# Patient Record
Sex: FEMALE | Race: WHITE | NOT HISPANIC OR LATINO | URBAN - METROPOLITAN AREA
[De-identification: names, ages, dates, MRNs, and addresses within clinical notes are randomized per-mention and may not be internally consistent; named-entity substitution may affect disease eponyms.]

---

## 2023-10-20 ENCOUNTER — OFFICE VISIT (OUTPATIENT)
Dept: URBAN - METROPOLITAN AREA CLINIC 82 | Facility: CLINIC | Age: 53
End: 2023-10-20
Payer: COMMERCIAL

## 2023-10-20 VITALS
DIASTOLIC BLOOD PRESSURE: 91 MMHG | BODY MASS INDEX: 37.32 KG/M2 | WEIGHT: 224 LBS | SYSTOLIC BLOOD PRESSURE: 136 MMHG | HEIGHT: 65 IN | TEMPERATURE: 98.1 F | HEART RATE: 92 BPM

## 2023-10-20 DIAGNOSIS — K76.0 FATTY LIVER: ICD-10-CM

## 2023-10-20 DIAGNOSIS — K50.111 CROHN'S DISEASE OF LARGE INTESTINE WITH RECTAL BLEEDING: ICD-10-CM

## 2023-10-20 PROBLEM — 197321007: Status: ACTIVE | Noted: 2023-10-20

## 2023-10-20 PROBLEM — 7620006: Status: ACTIVE | Noted: 2023-10-20

## 2023-10-20 PROCEDURE — 99204 OFFICE O/P NEW MOD 45 MIN: CPT | Performed by: INTERNAL MEDICINE

## 2023-10-20 NOTE — HPI-TODAY'S VISIT:
Patient is 52-year-old female goes by name Moi was seen today for complaints of having Crohn's flareup.  Patient reports diagnosis of Crohn's disease in 2019 diagnosed with a [facility].  At that time she has had episodes of rectal bleeding and diarrhea.  Patient had all the work-up from 2019 and they were reviewed including the recent CT scan of the abdomen pelvis.  Patient reports diagnosis of Crohn's colitis and she recalls that taking some medications which sounded more steroids at that time her symptoms have resolved on steroid treatment.  Patient was advised to go on proper Crohn's treatment however she declined any treatment because the concerns for side effects profile.  Patient stated her previous GI was okay with that and most recently in the past few months she started having more abdominal pain diarrhea constipation.  Patient underwent CT scan of the abdomen pelvis showing diffuse colitis.  Patient denies any daily bloody stools however she reports intermittent blood per rectum.  Patient also reports having right lower quadrant pain and left lower quadrant abdominal pain.  She was prescribed mesalamine by her GI recently and she could not continue with them because there are insurance reasons.  Patient stated she has not been compliant with the mesalamine and she felt like more even few doses of mesalamine has caused her constipation.  During the CT evaluation was liver was also noted to be fatty liver.  Patient denies any significant alcohol drinking.  Patient denies any daily steroid use or chronic steroid use.  Patient has a BMI of 37.7.  Denies any family history of IBD or cirrhosis of the liver.  Patient did not have any prior complications from Crohn's disease did not have any prior surgeries or strictures or fistulas.

## 2023-10-23 ENCOUNTER — WEB ENCOUNTER (OUTPATIENT)
Dept: URBAN - METROPOLITAN AREA CLINIC 82 | Facility: CLINIC | Age: 53
End: 2023-10-23

## 2023-10-27 LAB
A/G RATIO: 1.8
ABSOLUTE BASOPHILS: 32
ABSOLUTE EOSINOPHILS: 21
ABSOLUTE LYMPHOCYTES: 1134
ABSOLUTE MONOCYTES: 392
ABSOLUTE NEUTROPHILS: 3721
ALBUMIN: 4.6
ALKALINE PHOSPHATASE: 113
ALT (SGPT): 28
ANA PATTERN: (no result)
ANA SCREEN, IFA: POSITIVE
ANTINUCLEAR ANTIBODIES, IFA: (no result)
AST (SGOT): 20
BASOPHILS: 0.6
BILIRUBIN, TOTAL: 0.5
BUN/CREATININE RATIO: (no result)
BUN: 14
C-REACTIVE PROTEIN, QUANT: 3
CALCIUM: 9.8
CARBON DIOXIDE, TOTAL: 28
CHLORIDE: 102
CREATININE: 0.74
EGFR: 97
EOSINOPHILS: 0.4
GLOBULIN, TOTAL: 2.6
GLUCOSE: 90
HEMATOCRIT: 48.2
HEMOGLOBIN: 16.2
HEPATITIS A AB, TOTAL: (no result)
HEPATITIS B CORE AB TOTAL: (no result)
HEPATITIS B SURFACE AB IMMUNITY, QN: <5
HEPATITIS B SURFACE ANTIGEN: (no result)
HEPATITIS C ANTIBODY: (no result)
IMMUNOGLOBULIN A: 211
INTERPRETATION: (no result)
LYMPHOCYTES: 21.4
MCH: 30.3
MCHC: 33.6
MCV: 90.1
MITOCHONDRIAL (M2) ANTIBODY: <=20
MITOGEN-NIL: >10
MONOCYTES: 7.4
MPV: 10.7
NEUTROPHILS: 70.2
PLATELET COUNT: 279
POTASSIUM: 4.4
PROTEIN, TOTAL: 7.2
QUANTIFERON NIL VALUE: 0.04
QUANTIFERON TB1 AG VALUE: 0
QUANTIFERON TB2 AG VALUE: <0
QUANTIFERON-TB GOLD PLUS: NEGATIVE
RDW: 12.3
RED BLOOD CELL COUNT: 5.35
SODIUM: 141
TISSUE TRANSGLUTAMINASE AB, IGA: <1
WHITE BLOOD CELL COUNT: 5.3

## 2023-12-21 ENCOUNTER — OFFICE VISIT (OUTPATIENT)
Dept: URBAN - METROPOLITAN AREA CLINIC 37 | Facility: CLINIC | Age: 53
End: 2023-12-21
Payer: COMMERCIAL

## 2023-12-21 ENCOUNTER — LAB OUTSIDE AN ENCOUNTER (OUTPATIENT)
Dept: URBAN - METROPOLITAN AREA CLINIC 37 | Facility: CLINIC | Age: 53
End: 2023-12-21

## 2023-12-21 VITALS
HEIGHT: 65 IN | DIASTOLIC BLOOD PRESSURE: 82 MMHG | OXYGEN SATURATION: 96 % | SYSTOLIC BLOOD PRESSURE: 128 MMHG | WEIGHT: 223 LBS | BODY MASS INDEX: 37.15 KG/M2 | HEART RATE: 96 BPM

## 2023-12-21 DIAGNOSIS — K50.111 CROHN'S DISEASE OF LARGE INTESTINE WITH RECTAL BLEEDING: ICD-10-CM

## 2023-12-21 DIAGNOSIS — K76.0 FATTY LIVER: ICD-10-CM

## 2023-12-21 DIAGNOSIS — R12 CHRONIC HEARTBURN: ICD-10-CM

## 2023-12-21 PROBLEM — 16331000: Status: ACTIVE | Noted: 2023-12-21

## 2023-12-21 PROCEDURE — 99214 OFFICE O/P EST MOD 30 MIN: CPT | Performed by: INTERNAL MEDICINE

## 2023-12-21 RX ORDER — SOD SULF/POT CHLORIDE/MAG SULF 1.479 G
12 TABLETS THE FIRST DOSE THE EVENING BEFORE AND SECOND DOSE THE MORNING OF COLONOSCOPY TABLET ORAL AS DIRECTED
Qty: 24 | Refills: 0 | OUTPATIENT
Start: 2023-12-21 | End: 2023-12-23

## 2023-12-21 RX ORDER — MESALAMINE 1.2 G/1
2 TABLETS WITH A MEAL TABLET, DELAYED RELEASE ORAL ONCE A DAY
Status: ACTIVE | COMMUNITY

## 2023-12-21 RX ORDER — FAMOTIDINE 40 MG/1
1 TABLET AT BEDTIME TABLET, FILM COATED ORAL ONCE A DAY
Qty: 30 | Refills: 3 | OUTPATIENT
Start: 2023-12-21

## 2023-12-21 RX ORDER — MESALAMINE 1.2 G/1
2 TABLETS WITH A MEAL TABLET, DELAYED RELEASE ORAL ONCE A DAY
Qty: 60 | Refills: 1 | OUTPATIENT
Start: 2023-12-21 | End: 2024-02-19

## 2023-12-21 NOTE — HPI-HEARTBURN
Onset of symptoms: Intermittently since teenage years Description: Constant, but is currently well-managed with current medication regimen Aggravating factors: Fatty foods, sugery foods, weight gain Associated symptoms: Patient admits dyspepsia. Patient denies any symptoms of nausea, vomiting, early satiety, dysphagia, or odynophagia. Medications tried: Pepcid 40 mg QHS, Tums Test/evaluations done previously: Last EGD done on 08/19/2019 with Dr. Wally Hanson. No histologic evidene of H. pylori or intestinal metaplasia

## 2023-12-21 NOTE — HPI-CROHNS
John is here for an second opinion was previously following up with former GI, Dr. Wally Hanson and Dr. Linda Carney. Patient is establishing care with me since her insurance recently changed and is not accepted by her previous GI.

## 2023-12-21 NOTE — HPI-FATTY LIVER
Intially findings on CT Abdomen and Pelvis done on 08/2023, see below Patient was seen by Dr. Linda Dubois 2 months ago and ordered labs, which were completed on 10/2023.  Labs showed mild CLAYTON elevation and normal LFT's  Also negative serology for viral hepatitis and no immunity to Hepatitis B Patient denies use of alcohol Patient denies any family history of liver disease or malignancy

## 2024-03-01 ENCOUNTER — LAB (OUTPATIENT)
Dept: URBAN - METROPOLITAN AREA CLINIC 4 | Facility: CLINIC | Age: 54
End: 2024-03-01
Payer: COMMERCIAL

## 2024-03-01 ENCOUNTER — COL/EGD (OUTPATIENT)
Dept: URBAN - METROPOLITAN AREA SURGERY CENTER 8 | Facility: SURGERY CENTER | Age: 54
End: 2024-03-01
Payer: COMMERCIAL

## 2024-03-01 DIAGNOSIS — K31.89 OTHER DISEASES OF STOMACH AND DUODENUM: ICD-10-CM

## 2024-03-01 DIAGNOSIS — K31.7 BENIGN GASTRIC POLYP: ICD-10-CM

## 2024-03-01 DIAGNOSIS — D12.4 ADENOMA OF DESCENDING COLON: ICD-10-CM

## 2024-03-01 DIAGNOSIS — K63.89 OTHER SPECIFIED DISEASES OF INTESTINE: ICD-10-CM

## 2024-03-01 DIAGNOSIS — D12.3 ADENOMA OF TRANSVERSE COLON: ICD-10-CM

## 2024-03-01 DIAGNOSIS — K21.9 ACID REFLUX: ICD-10-CM

## 2024-03-01 DIAGNOSIS — K63.5 BENIGN COLON POLYP: ICD-10-CM

## 2024-03-01 PROCEDURE — 43255 EGD CONTROL BLEEDING ANY: CPT | Performed by: INTERNAL MEDICINE

## 2024-03-01 PROCEDURE — 88312 SPECIAL STAINS GROUP 1: CPT | Performed by: PATHOLOGY

## 2024-03-01 PROCEDURE — 43239 EGD BIOPSY SINGLE/MULTIPLE: CPT | Performed by: INTERNAL MEDICINE

## 2024-03-01 PROCEDURE — 43250 EGD CAUTERY TUMOR POLYP: CPT | Performed by: INTERNAL MEDICINE

## 2024-03-01 PROCEDURE — 45385 COLONOSCOPY W/LESION REMOVAL: CPT | Performed by: INTERNAL MEDICINE

## 2024-03-01 PROCEDURE — 88305 TISSUE EXAM BY PATHOLOGIST: CPT | Performed by: PATHOLOGY

## 2024-03-01 PROCEDURE — 45380 COLONOSCOPY AND BIOPSY: CPT | Performed by: INTERNAL MEDICINE

## 2024-03-01 RX ORDER — MESALAMINE 1.2 G/1
2 TABLETS WITH A MEAL TABLET, DELAYED RELEASE ORAL ONCE A DAY
Status: ACTIVE | COMMUNITY

## 2024-03-01 RX ORDER — FAMOTIDINE 40 MG/1
1 TABLET AT BEDTIME TABLET, FILM COATED ORAL ONCE A DAY
Qty: 30 | Refills: 3 | Status: ACTIVE | COMMUNITY
Start: 2023-12-21

## 2024-03-19 PROBLEM — 1230009008: Status: ACTIVE | Noted: 2024-03-19

## 2024-03-19 PROBLEM — 724538004: Status: ACTIVE | Noted: 2024-03-19

## 2024-03-19 PROBLEM — 266433003: Status: ACTIVE | Noted: 2024-03-19

## 2024-03-19 PROBLEM — 300855001: Status: ACTIVE | Noted: 2024-03-19

## 2024-03-19 PROBLEM — 428283002: Status: ACTIVE | Noted: 2024-03-19

## 2024-03-21 ENCOUNTER — OV EP (OUTPATIENT)
Dept: URBAN - METROPOLITAN AREA CLINIC 37 | Facility: CLINIC | Age: 54
End: 2024-03-21
Payer: COMMERCIAL

## 2024-03-21 VITALS
HEIGHT: 65 IN | DIASTOLIC BLOOD PRESSURE: 80 MMHG | SYSTOLIC BLOOD PRESSURE: 160 MMHG | BODY MASS INDEX: 36.65 KG/M2 | WEIGHT: 220 LBS

## 2024-03-21 DIAGNOSIS — K76.0 FATTY LIVER: ICD-10-CM

## 2024-03-21 DIAGNOSIS — K31.7 POLYP OF STOMACH AND DUODENUM: ICD-10-CM

## 2024-03-21 DIAGNOSIS — K21.00 GASTROESOPHAGEAL REFLUX DISEASE WITH ESOPHAGITIS WITHOUT HEMORRHAGE: ICD-10-CM

## 2024-03-21 DIAGNOSIS — K57.30 DIVERTICULOSIS OF LARGE INTESTINE WITHOUT PERFORATION OR ABSCESS WITHOUT BLEEDING: ICD-10-CM

## 2024-03-21 DIAGNOSIS — K50.111 CROHN'S DISEASE OF LARGE INTESTINE WITH RECTAL BLEEDING: ICD-10-CM

## 2024-03-21 DIAGNOSIS — K29.01 ACUTE SUPERFICIAL GASTRITIS WITH HEMORRHAGE: ICD-10-CM

## 2024-03-21 DIAGNOSIS — Z86.010 PERSONAL HISTORY OF COLONIC POLYPS: ICD-10-CM

## 2024-03-21 DIAGNOSIS — D12.6 SESSILE SERRATED POLYP OF COLON: ICD-10-CM

## 2024-03-21 PROBLEM — 2367005: Status: ACTIVE | Noted: 2024-03-21

## 2024-03-21 PROCEDURE — 99214 OFFICE O/P EST MOD 30 MIN: CPT | Performed by: INTERNAL MEDICINE

## 2024-03-21 RX ORDER — MESALAMINE 1.2 G/1
2 TABLETS WITH A MEAL TABLET, DELAYED RELEASE ORAL ONCE A DAY
Qty: 180 TABLET | Refills: 3 | OUTPATIENT
Start: 2024-03-21 | End: 2025-03-16

## 2024-03-21 RX ORDER — MESALAMINE 1.2 G/1
2 TABLETS WITH A MEAL TABLET, DELAYED RELEASE ORAL ONCE A DAY
Qty: 60 TABLET | Refills: 1 | Status: ACTIVE | COMMUNITY

## 2024-03-21 RX ORDER — FAMOTIDINE 40 MG/1
1 TABLET AT BEDTIME TABLET, FILM COATED ORAL ONCE A DAY
Qty: 30 | Refills: 3 | Status: DISCONTINUED | COMMUNITY
Start: 2023-12-21

## 2024-03-21 RX ORDER — SUCRALFATE 1 G/1
1 TABLET ON AN EMPTY STOMACH TABLET ORAL
Qty: 60 | Refills: 2 | OUTPATIENT
Start: 2024-03-21 | End: 2024-06-19

## 2024-03-21 RX ORDER — SUCRALFATE 1 G/1
1 TABLET ON AN EMPTY STOMACH TABLET ORAL THREE TIMES A DAY
Qty: 90 TABLET | Refills: 3 | Status: ACTIVE | COMMUNITY
Start: 2024-03-01 | End: 2024-06-29

## 2024-03-21 NOTE — HPI-FATTY LIVER
Patient is here for a routine follow up due to fatty liver Last OV was 3 months ago Initially findings on CT Abdomen and Pelvis done on 08/2023, see below Patient was seen by Dr. Linda Dubois in 10/2023 and ordered labs, which were completed on 10/2023.  Labs showed mild CLAYTON elevation and normal LFT's  Also negative serology for viral hepatitis and no immunity to Hepatitis B Patient denies use of alcohol Patient denies any family history of liver disease or malignancy

## 2024-06-20 ENCOUNTER — OFFICE VISIT (OUTPATIENT)
Dept: URBAN - METROPOLITAN AREA CLINIC 37 | Facility: CLINIC | Age: 54
End: 2024-06-20
Payer: COMMERCIAL

## 2024-06-20 ENCOUNTER — DASHBOARD ENCOUNTERS (OUTPATIENT)
Age: 54
End: 2024-06-20

## 2024-06-20 VITALS — BODY MASS INDEX: 37.99 KG/M2 | WEIGHT: 228 LBS | HEIGHT: 65 IN

## 2024-06-20 DIAGNOSIS — D12.6 SESSILE SERRATED POLYP OF COLON: ICD-10-CM

## 2024-06-20 DIAGNOSIS — K31.7 POLYP OF STOMACH AND DUODENUM: ICD-10-CM

## 2024-06-20 DIAGNOSIS — K57.30 DIVERTICULOSIS OF LARGE INTESTINE WITHOUT PERFORATION OR ABSCESS WITHOUT BLEEDING: ICD-10-CM

## 2024-06-20 DIAGNOSIS — K76.0 FATTY LIVER: ICD-10-CM

## 2024-06-20 DIAGNOSIS — K29.01 ACUTE SUPERFICIAL GASTRITIS WITH HEMORRHAGE: ICD-10-CM

## 2024-06-20 DIAGNOSIS — K50.111 CROHN'S DISEASE OF LARGE INTESTINE WITH RECTAL BLEEDING: ICD-10-CM

## 2024-06-20 DIAGNOSIS — Z86.010 PERSONAL HISTORY OF COLONIC POLYPS: ICD-10-CM

## 2024-06-20 DIAGNOSIS — K21.00 GASTROESOPHAGEAL REFLUX DISEASE WITH ESOPHAGITIS WITHOUT HEMORRHAGE: ICD-10-CM

## 2024-06-20 PROCEDURE — 99214 OFFICE O/P EST MOD 30 MIN: CPT | Performed by: INTERNAL MEDICINE

## 2024-06-20 RX ORDER — MESALAMINE 1.2 G/1
2 TABLETS WITH A MEAL TABLET, DELAYED RELEASE ORAL ONCE A DAY
Qty: 60 TABLET | Refills: 1 | Status: ACTIVE | COMMUNITY

## 2024-06-20 RX ORDER — SUCRALFATE 1 G/1
1 TABLET ON AN EMPTY STOMACH TABLET ORAL THREE TIMES A DAY
Qty: 90 TABLET | Refills: 3 | Status: ACTIVE | COMMUNITY
Start: 2024-03-01 | End: 2024-06-29

## 2024-06-20 RX ORDER — MESALAMINE 1.2 G/1
2 TABLETS WITH A MEAL TABLET, DELAYED RELEASE ORAL ONCE A DAY
Qty: 180 TABLET | Refills: 3

## 2024-06-20 RX ORDER — SUCRALFATE 1 G/1
1 TABLET ON AN EMPTY STOMACH TABLET ORAL TWICE A DAY
Qty: 60 | Refills: 3
Start: 2024-06-20 | End: 2024-10-18

## 2024-09-26 ENCOUNTER — TELEPHONE ENCOUNTER (OUTPATIENT)
Dept: URBAN - METROPOLITAN AREA CLINIC 35 | Facility: CLINIC | Age: 54
End: 2024-09-26

## 2024-09-26 ENCOUNTER — LAB OUTSIDE AN ENCOUNTER (OUTPATIENT)
Dept: URBAN - METROPOLITAN AREA CLINIC 37 | Facility: CLINIC | Age: 54
End: 2024-09-26

## 2024-09-26 ENCOUNTER — OFFICE VISIT (OUTPATIENT)
Dept: URBAN - METROPOLITAN AREA CLINIC 37 | Facility: CLINIC | Age: 54
End: 2024-09-26
Payer: COMMERCIAL

## 2024-09-26 VITALS — HEART RATE: 76 BPM | BODY MASS INDEX: 37.82 KG/M2 | OXYGEN SATURATION: 98 % | WEIGHT: 227 LBS | HEIGHT: 65 IN

## 2024-09-26 DIAGNOSIS — K76.0 FATTY LIVER: ICD-10-CM

## 2024-09-26 DIAGNOSIS — K50.111 CROHN'S DISEASE OF LARGE INTESTINE WITH RECTAL BLEEDING: ICD-10-CM

## 2024-09-26 DIAGNOSIS — D12.6 SESSILE SERRATED POLYP OF COLON: ICD-10-CM

## 2024-09-26 DIAGNOSIS — Z86.010 PERSONAL HISTORY OF COLONIC POLYPS: ICD-10-CM

## 2024-09-26 DIAGNOSIS — K57.30 DIVERTICULOSIS OF LARGE INTESTINE WITHOUT PERFORATION OR ABSCESS WITHOUT BLEEDING: ICD-10-CM

## 2024-09-26 DIAGNOSIS — K21.00 GASTROESOPHAGEAL REFLUX DISEASE WITH ESOPHAGITIS WITHOUT HEMORRHAGE: ICD-10-CM

## 2024-09-26 DIAGNOSIS — K29.01 ACUTE SUPERFICIAL GASTRITIS WITH HEMORRHAGE: ICD-10-CM

## 2024-09-26 DIAGNOSIS — K31.7 POLYP OF STOMACH AND DUODENUM: ICD-10-CM

## 2024-09-26 PROCEDURE — 99214 OFFICE O/P EST MOD 30 MIN: CPT | Performed by: INTERNAL MEDICINE

## 2024-09-26 RX ORDER — SUCRALFATE 1 G/1
1 TABLET ON AN EMPTY STOMACH TABLET ORAL
Qty: 90 | Refills: 1

## 2024-09-26 RX ORDER — MESALAMINE 1.2 G/1
2 TABLETS WITH A MEAL TABLET, DELAYED RELEASE ORAL ONCE A DAY
Qty: 180 TABLET | Refills: 3

## 2024-09-26 RX ORDER — SUCRALFATE 1 G/1
1 TABLET ON AN EMPTY STOMACH TABLET ORAL DAILY
Qty: 30 TABLET | Refills: 3 | Status: ACTIVE | COMMUNITY
Start: 2024-06-20 | End: 2024-10-18

## 2024-09-26 RX ORDER — MESALAMINE 1.2 G/1
2 TABLETS WITH A MEAL TABLET, DELAYED RELEASE ORAL ONCE A DAY
Qty: 180 TABLET | Refills: 3 | Status: ACTIVE | COMMUNITY

## 2024-11-21 ENCOUNTER — APPOINTMENT (RX ONLY)
Dept: URBAN - METROPOLITAN AREA CLINIC 45 | Facility: CLINIC | Age: 54
Setting detail: DERMATOLOGY
End: 2024-11-21

## 2024-11-21 DIAGNOSIS — L91.8 OTHER HYPERTROPHIC DISORDERS OF THE SKIN: ICD-10-CM

## 2024-11-21 DIAGNOSIS — D22 MELANOCYTIC NEVI: ICD-10-CM

## 2024-11-21 DIAGNOSIS — L81.4 OTHER MELANIN HYPERPIGMENTATION: ICD-10-CM

## 2024-11-21 DIAGNOSIS — L82.1 OTHER SEBORRHEIC KERATOSIS: ICD-10-CM

## 2024-11-21 DIAGNOSIS — D18.0 HEMANGIOMA: ICD-10-CM

## 2024-11-21 DIAGNOSIS — L98.8 OTHER SPECIFIED DISORDERS OF THE SKIN AND SUBCUTANEOUS TISSUE: ICD-10-CM | Status: INADEQUATELY CONTROLLED

## 2024-11-21 PROBLEM — D22.5 MELANOCYTIC NEVI OF TRUNK: Status: ACTIVE | Noted: 2024-11-21

## 2024-11-21 PROBLEM — D18.01 HEMANGIOMA OF SKIN AND SUBCUTANEOUS TISSUE: Status: ACTIVE | Noted: 2024-11-21

## 2024-11-21 PROCEDURE — 99203 OFFICE O/P NEW LOW 30 MIN: CPT

## 2024-11-21 PROCEDURE — ? FULL BODY SKIN EXAM

## 2024-11-21 PROCEDURE — ? TREATMENT REGIMEN

## 2024-11-21 PROCEDURE — ? PRESCRIPTION

## 2024-11-21 PROCEDURE — ? COUNSELING

## 2024-11-21 RX ORDER — TRETIONIN 0.25 MG/G
CREAM TOPICAL
Qty: 45 | Refills: 4 | Status: ERX | COMMUNITY
Start: 2024-11-21

## 2024-11-21 RX ADMIN — TRETIONIN: 0.25 CREAM TOPICAL at 00:00

## 2024-11-21 ASSESSMENT — LOCATION DETAILED DESCRIPTION DERM
LOCATION DETAILED: LEFT INFERIOR CENTRAL MALAR CHEEK
LOCATION DETAILED: RIGHT SUPERIOR UPPER BACK
LOCATION DETAILED: SUBXIPHOID
LOCATION DETAILED: RIGHT MEDIAL SUPERIOR CHEST
LOCATION DETAILED: LEFT RIB CAGE
LOCATION DETAILED: LEFT MID-UPPER BACK
LOCATION DETAILED: LEFT MEDIAL FOREHEAD

## 2024-11-21 ASSESSMENT — LOCATION SIMPLE DESCRIPTION DERM
LOCATION SIMPLE: LEFT FOREHEAD
LOCATION SIMPLE: RIGHT UPPER BACK
LOCATION SIMPLE: CHEST
LOCATION SIMPLE: ABDOMEN
LOCATION SIMPLE: LEFT CHEEK
LOCATION SIMPLE: LEFT UPPER BACK

## 2024-11-21 ASSESSMENT — LOCATION ZONE DERM
LOCATION ZONE: FACE
LOCATION ZONE: TRUNK

## 2024-11-21 NOTE — HPI: EVALUATION OF SKIN LESION(S)
What Type Of Note Output Would You Prefer (Optional)?: Bullet Format
Hpi Title: Evaluation of Skin Lesions
Additional History: PT has no SOC

## 2024-11-21 NOTE — PROCEDURE: MIPS QUALITY
Quality 394a: Meningococcal Immunizations For Adolescents: Patient had anaphylaxis due to the meningococcal vaccine any time on or before the patient’s 13th birthday
Quality 226: Preventive Care And Screening: Tobacco Use: Screening And Cessation Intervention: Tobacco Screening not Performed
Detail Level: Detailed
Quality 431: Preventive Care And Screening: Unhealthy Alcohol Use - Screening: Patient not identified as an unhealthy alcohol user when screened for unhealthy alcohol use using a systematic screening method
Quality 130: Documentation Of Current Medications In The Medical Record: Current Medications Documented

## 2025-03-27 ENCOUNTER — OFFICE VISIT (OUTPATIENT)
Dept: URBAN - METROPOLITAN AREA CLINIC 37 | Facility: CLINIC | Age: 55
End: 2025-03-27
Payer: COMMERCIAL

## 2025-03-27 DIAGNOSIS — K29.01 ACUTE SUPERFICIAL GASTRITIS WITH HEMORRHAGE: ICD-10-CM

## 2025-03-27 DIAGNOSIS — K57.30 DIVERTICULOSIS OF LARGE INTESTINE WITHOUT PERFORATION OR ABSCESS WITHOUT BLEEDING: ICD-10-CM

## 2025-03-27 DIAGNOSIS — D12.6 SESSILE SERRATED POLYP OF COLON: ICD-10-CM

## 2025-03-27 DIAGNOSIS — K31.7 POLYP OF STOMACH AND DUODENUM: ICD-10-CM

## 2025-03-27 DIAGNOSIS — K50.111 CROHN'S DISEASE OF LARGE INTESTINE WITH RECTAL BLEEDING: ICD-10-CM

## 2025-03-27 DIAGNOSIS — Z86.010 PERSONAL HISTORY OF COLONIC POLYPS: ICD-10-CM

## 2025-03-27 DIAGNOSIS — K76.0 FATTY LIVER: ICD-10-CM

## 2025-03-27 DIAGNOSIS — K21.00 GASTROESOPHAGEAL REFLUX DISEASE WITH ESOPHAGITIS WITHOUT HEMORRHAGE: ICD-10-CM

## 2025-03-27 PROCEDURE — 99214 OFFICE O/P EST MOD 30 MIN: CPT | Performed by: INTERNAL MEDICINE

## 2025-03-27 RX ORDER — SUCRALFATE 1 G/1
1 TABLET ON AN EMPTY STOMACH TABLET ORAL
Qty: 90 | Refills: 1
Start: 2025-03-24

## 2025-03-27 RX ORDER — MESALAMINE 1.2 G/1
2 TABLETS WITH A MEAL TABLET, DELAYED RELEASE ORAL ONCE A DAY
Qty: 180 TABLET | Refills: 3 | Status: ACTIVE | COMMUNITY

## 2025-03-27 RX ORDER — SUCRALFATE 1 G/1
1 TABLET ON AN EMPTY STOMACH TABLET ORAL
Qty: 90 | Refills: 1 | Status: ACTIVE | COMMUNITY

## 2025-03-27 RX ORDER — MESALAMINE 1.2 G/1
2 TABLETS WITH A MEAL TABLET, DELAYED RELEASE ORAL ONCE A DAY
Qty: 180 TABLET | Refills: 3
Start: 2025-03-24

## 2025-03-27 RX ORDER — FLUTICASONE PROPIONATE 50 UG/1
1 SPRAY IN EACH NOSTRIL SPRAY, METERED NASAL TWICE A DAY
Status: ACTIVE | COMMUNITY

## 2025-03-27 NOTE — HPI-FATTY LIVER
Patient is here for a routine follow up due to fatty liver Last OV was 6 months ago Initially findings on CT Abdomen and Pelvis done on 08/2023, see below Patient was seen by Dr. Linda Dubois in 10/2023 and ordered labs, which were completed on 10/2023.  Labs showed mild CLAYTON elevation and normal LFT's  Also negative serology for viral hepatitis and no immunity to Hepatitis B Patient denies use of alcohol Patient denies any family history of liver disease or malignancy She is currently on Tirzepatide 0.5 weekly injection for about 4 months and has lost about 12 lbs